# Patient Record
Sex: MALE | Race: WHITE
[De-identification: names, ages, dates, MRNs, and addresses within clinical notes are randomized per-mention and may not be internally consistent; named-entity substitution may affect disease eponyms.]

---

## 2020-06-13 ENCOUNTER — HOSPITAL ENCOUNTER (EMERGENCY)
Dept: HOSPITAL 54 - ER | Age: 60
LOS: 1 days | Discharge: HOME | End: 2020-06-14
Payer: SELF-PAY

## 2020-06-13 VITALS — BODY MASS INDEX: 30.82 KG/M2 | WEIGHT: 185 LBS | HEIGHT: 65 IN

## 2020-06-13 DIAGNOSIS — I10: ICD-10-CM

## 2020-06-13 DIAGNOSIS — E66.8: ICD-10-CM

## 2020-06-13 DIAGNOSIS — T78.1XXA: Primary | ICD-10-CM

## 2020-06-13 DIAGNOSIS — Z91.013: ICD-10-CM
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
## 2020-06-13 PROCEDURE — 96375 TX/PRO/DX INJ NEW DRUG ADDON: CPT

## 2020-06-13 PROCEDURE — 96374 THER/PROPH/DIAG INJ IV PUSH: CPT

## 2020-06-13 PROCEDURE — 96372 THER/PROPH/DIAG INJ SC/IM: CPT

## 2020-06-13 PROCEDURE — 99285 EMERGENCY DEPT VISIT HI MDM: CPT

## 2020-06-13 NOTE — NUR
PT AAOX4. C/O GEN BODY RASH/REDNESS, SLIGHT SOB AFTER EATING SHRIMP AT 7PM. 
PLACED ON MONITOR AND PULSE OX. VSS. % ON ROOM AIR. DENIES PAIN. 
AWAITING MD FOR EVAL.

## 2020-06-14 VITALS — DIASTOLIC BLOOD PRESSURE: 89 MMHG | SYSTOLIC BLOOD PRESSURE: 149 MMHG

## 2020-06-14 NOTE — NUR
Patient discharged to home in stable condition. Written and verbal after care 
instructions given. Patient verbalizes understanding of instruction and RX. Pt 
ambulated with steady gait.